# Patient Record
Sex: MALE | Race: WHITE | NOT HISPANIC OR LATINO | Employment: OTHER | URBAN - METROPOLITAN AREA
[De-identification: names, ages, dates, MRNs, and addresses within clinical notes are randomized per-mention and may not be internally consistent; named-entity substitution may affect disease eponyms.]

---

## 2022-05-19 DIAGNOSIS — M25.511 RIGHT SHOULDER PAIN, UNSPECIFIED CHRONICITY: Primary | ICD-10-CM

## 2024-12-10 ENCOUNTER — HOSPITAL ENCOUNTER (INPATIENT)
Facility: HOSPITAL | Age: 89
LOS: 2 days | Discharge: HOSPICE | DRG: 057 | End: 2024-12-12
Attending: STUDENT IN AN ORGANIZED HEALTH CARE EDUCATION/TRAINING PROGRAM | Admitting: INTERNAL MEDICINE
Payer: MEDICARE

## 2024-12-10 DIAGNOSIS — G20.A1 PARKINSON'S DISEASE, UNSPECIFIED WHETHER DYSKINESIA PRESENT, UNSPECIFIED WHETHER MANIFESTATIONS FLUCTUATE: ICD-10-CM

## 2024-12-10 DIAGNOSIS — R06.00 DYSPNEA: ICD-10-CM

## 2024-12-10 DIAGNOSIS — R44.1 HALLUCINATION, VISUAL: Primary | ICD-10-CM

## 2024-12-10 PROBLEM — R44.3 HALLUCINATIONS: Status: ACTIVE | Noted: 2024-12-10

## 2024-12-10 LAB
ALBUMIN SERPL-MCNC: 3.5 G/DL (ref 3.4–4.8)
ALBUMIN/GLOB SERPL: 1.1 RATIO (ref 1.1–2)
ALP SERPL-CCNC: 87 UNIT/L (ref 40–150)
ALT SERPL-CCNC: 8 UNIT/L (ref 0–55)
ANION GAP SERPL CALC-SCNC: 3 MEQ/L
AST SERPL-CCNC: 30 UNIT/L (ref 5–34)
BACTERIA #/AREA URNS AUTO: ABNORMAL /HPF
BASOPHILS # BLD AUTO: 0.04 X10(3)/MCL
BASOPHILS NFR BLD AUTO: 0.9 %
BILIRUB SERPL-MCNC: 1.4 MG/DL
BILIRUB UR QL STRIP.AUTO: NEGATIVE
BUN SERPL-MCNC: 27.7 MG/DL (ref 8.4–25.7)
CALCIUM SERPL-MCNC: 9.2 MG/DL (ref 8.8–10)
CHLORIDE SERPL-SCNC: 105 MMOL/L (ref 98–107)
CLARITY UR: ABNORMAL
CO2 SERPL-SCNC: 34 MMOL/L (ref 23–31)
COLOR UR AUTO: YELLOW
CREAT SERPL-MCNC: 1 MG/DL (ref 0.72–1.25)
CREAT/UREA NIT SERPL: 28
EOSINOPHIL # BLD AUTO: 0.05 X10(3)/MCL (ref 0–0.9)
EOSINOPHIL NFR BLD AUTO: 1.1 %
ERYTHROCYTE [DISTWIDTH] IN BLOOD BY AUTOMATED COUNT: 20.9 % (ref 11.5–17)
FLUAV AG UPPER RESP QL IA.RAPID: NOT DETECTED
FLUBV AG UPPER RESP QL IA.RAPID: NOT DETECTED
GFR SERPLBLD CREATININE-BSD FMLA CKD-EPI: >60 ML/MIN/1.73/M2
GLOBULIN SER-MCNC: 3.2 GM/DL (ref 2.4–3.5)
GLUCOSE SERPL-MCNC: 97 MG/DL (ref 82–115)
GLUCOSE UR QL STRIP: NORMAL
HCT VFR BLD AUTO: 29.7 % (ref 42–52)
HGB BLD-MCNC: 8.7 G/DL (ref 14–18)
HGB UR QL STRIP: ABNORMAL
IMM GRANULOCYTES # BLD AUTO: 0.02 X10(3)/MCL (ref 0–0.04)
IMM GRANULOCYTES NFR BLD AUTO: 0.4 %
KETONES UR QL STRIP: NEGATIVE
LACTATE SERPL-SCNC: 1.1 MMOL/L (ref 0.5–2.2)
LEUKOCYTE ESTERASE UR QL STRIP: 25
LYMPHOCYTES # BLD AUTO: 0.97 X10(3)/MCL (ref 0.6–4.6)
LYMPHOCYTES NFR BLD AUTO: 21.2 %
MCH RBC QN AUTO: 24.6 PG (ref 27–31)
MCHC RBC AUTO-ENTMCNC: 29.3 G/DL (ref 33–36)
MCV RBC AUTO: 84.1 FL (ref 80–94)
MONOCYTES # BLD AUTO: 0.56 X10(3)/MCL (ref 0.1–1.3)
MONOCYTES NFR BLD AUTO: 12.3 %
MUCOUS THREADS URNS QL MICRO: ABNORMAL /LPF
NEUTROPHILS # BLD AUTO: 2.93 X10(3)/MCL (ref 2.1–9.2)
NEUTROPHILS NFR BLD AUTO: 64.1 %
NITRITE UR QL STRIP: NEGATIVE
NRBC BLD AUTO-RTO: 0 %
PH UR STRIP: 6.5 [PH]
PLATELET # BLD AUTO: 128 X10(3)/MCL (ref 130–400)
PLATELETS.RETICULATED NFR BLD AUTO: 13.9 % (ref 0.9–11.2)
PMV BLD AUTO: ABNORMAL FL
POTASSIUM SERPL-SCNC: 4.4 MMOL/L (ref 3.5–5.1)
PROT SERPL-MCNC: 6.7 GM/DL (ref 5.8–7.6)
PROT UR QL STRIP: ABNORMAL
RBC # BLD AUTO: 3.53 X10(6)/MCL (ref 4.7–6.1)
RBC #/AREA URNS AUTO: >100 /HPF
RSV A 5' UTR RNA NPH QL NAA+PROBE: NOT DETECTED
SARS-COV-2 RNA RESP QL NAA+PROBE: NOT DETECTED
SODIUM SERPL-SCNC: 142 MMOL/L (ref 136–145)
SP GR UR STRIP.AUTO: 1.02 (ref 1–1.03)
SQUAMOUS #/AREA URNS LPF: ABNORMAL /HPF
TSH SERPL-ACNC: 14.49 UIU/ML (ref 0.35–4.94)
UROBILINOGEN UR STRIP-ACNC: NORMAL
VIT B12 SERPL-MCNC: >2000 PG/ML (ref 213–816)
WBC # BLD AUTO: 4.57 X10(3)/MCL (ref 4.5–11.5)
WBC #/AREA URNS AUTO: ABNORMAL /HPF

## 2024-12-10 PROCEDURE — 83605 ASSAY OF LACTIC ACID: CPT | Performed by: PHYSICIAN ASSISTANT

## 2024-12-10 PROCEDURE — 84443 ASSAY THYROID STIM HORMONE: CPT | Performed by: INTERNAL MEDICINE

## 2024-12-10 PROCEDURE — 80053 COMPREHEN METABOLIC PANEL: CPT | Performed by: PHYSICIAN ASSISTANT

## 2024-12-10 PROCEDURE — 81001 URINALYSIS AUTO W/SCOPE: CPT | Performed by: PHYSICIAN ASSISTANT

## 2024-12-10 PROCEDURE — 82607 VITAMIN B-12: CPT | Performed by: INTERNAL MEDICINE

## 2024-12-10 PROCEDURE — 25000003 PHARM REV CODE 250

## 2024-12-10 PROCEDURE — 85025 COMPLETE CBC W/AUTO DIFF WBC: CPT | Performed by: PHYSICIAN ASSISTANT

## 2024-12-10 PROCEDURE — 25000003 PHARM REV CODE 250: Performed by: INTERNAL MEDICINE

## 2024-12-10 PROCEDURE — 99222 1ST HOSP IP/OBS MODERATE 55: CPT | Mod: ,,, | Performed by: SPECIALIST

## 2024-12-10 PROCEDURE — 0241U COVID/RSV/FLU A&B PCR: CPT | Performed by: STUDENT IN AN ORGANIZED HEALTH CARE EDUCATION/TRAINING PROGRAM

## 2024-12-10 PROCEDURE — 99285 EMERGENCY DEPT VISIT HI MDM: CPT | Mod: 25

## 2024-12-10 PROCEDURE — 87040 BLOOD CULTURE FOR BACTERIA: CPT | Performed by: PHYSICIAN ASSISTANT

## 2024-12-10 PROCEDURE — 11000001 HC ACUTE MED/SURG PRIVATE ROOM

## 2024-12-10 RX ORDER — QUETIAPINE FUMARATE 25 MG/1
25 TABLET, FILM COATED ORAL NIGHTLY
Status: DISCONTINUED | OUTPATIENT
Start: 2024-12-10 | End: 2024-12-11

## 2024-12-10 RX ORDER — FINASTERIDE 5 MG/1
5 TABLET, FILM COATED ORAL DAILY
Status: ON HOLD | COMMUNITY
End: 2024-12-12

## 2024-12-10 RX ORDER — ONDANSETRON HYDROCHLORIDE 2 MG/ML
4 INJECTION, SOLUTION INTRAVENOUS EVERY 8 HOURS PRN
Status: DISCONTINUED | OUTPATIENT
Start: 2024-12-10 | End: 2024-12-12 | Stop reason: HOSPADM

## 2024-12-10 RX ORDER — ACETAMINOPHEN 325 MG/1
650 TABLET ORAL EVERY 8 HOURS PRN
Status: DISCONTINUED | OUTPATIENT
Start: 2024-12-10 | End: 2024-12-12 | Stop reason: HOSPADM

## 2024-12-10 RX ORDER — HYDRALAZINE HYDROCHLORIDE 20 MG/ML
10 INJECTION INTRAMUSCULAR; INTRAVENOUS EVERY 4 HOURS PRN
Status: DISCONTINUED | OUTPATIENT
Start: 2024-12-10 | End: 2024-12-12 | Stop reason: HOSPADM

## 2024-12-10 RX ORDER — DOXAZOSIN 8 MG/1
TABLET ORAL DAILY
Status: ON HOLD | COMMUNITY
End: 2024-12-12 | Stop reason: HOSPADM

## 2024-12-10 RX ORDER — IRON,CARBONYL/ASCORBIC ACID 100-250 MG
1 TABLET ORAL
COMMUNITY

## 2024-12-10 RX ORDER — DOXAZOSIN 4 MG/1
8 TABLET ORAL DAILY
Status: DISCONTINUED | OUTPATIENT
Start: 2024-12-11 | End: 2024-12-12 | Stop reason: HOSPADM

## 2024-12-10 RX ORDER — LANOLIN ALCOHOL/MO/W.PET/CERES
400 CREAM (GRAM) TOPICAL DAILY
Status: ON HOLD | COMMUNITY
End: 2024-12-12 | Stop reason: HOSPADM

## 2024-12-10 RX ORDER — LABETALOL HYDROCHLORIDE 5 MG/ML
20 INJECTION, SOLUTION INTRAVENOUS EVERY 4 HOURS PRN
Status: DISCONTINUED | OUTPATIENT
Start: 2024-12-10 | End: 2024-12-12 | Stop reason: HOSPADM

## 2024-12-10 RX ORDER — CARBIDOPA AND LEVODOPA 25; 100 MG/1; MG/1
1 TABLET, EXTENDED RELEASE ORAL
Status: ON HOLD | COMMUNITY
End: 2024-12-12

## 2024-12-10 RX ORDER — FINASTERIDE 5 MG/1
5 TABLET, FILM COATED ORAL DAILY
Status: DISCONTINUED | OUTPATIENT
Start: 2024-12-11 | End: 2024-12-12 | Stop reason: HOSPADM

## 2024-12-10 RX ORDER — ACETAMINOPHEN 325 MG/1
650 TABLET ORAL EVERY 4 HOURS PRN
Status: DISCONTINUED | OUTPATIENT
Start: 2024-12-10 | End: 2024-12-12 | Stop reason: HOSPADM

## 2024-12-10 RX ORDER — HYDRALAZINE HYDROCHLORIDE 20 MG/ML
10 INJECTION INTRAMUSCULAR; INTRAVENOUS
Status: DISCONTINUED | OUTPATIENT
Start: 2024-12-10 | End: 2024-12-10

## 2024-12-10 RX ORDER — METOPROLOL SUCCINATE 50 MG/1
75 TABLET, EXTENDED RELEASE ORAL DAILY
Status: ON HOLD | COMMUNITY
End: 2024-12-12 | Stop reason: HOSPADM

## 2024-12-10 RX ADMIN — QUETIAPINE FUMARATE 25 MG: 25 TABLET ORAL at 08:12

## 2024-12-10 RX ADMIN — APIXABAN 5 MG: 5 TABLET, FILM COATED ORAL at 11:12

## 2024-12-10 RX ADMIN — CARBIDOPA AND LEVODOPA 1 SPLIT TABLET: 25; 100 TABLET ORAL at 08:12

## 2024-12-10 RX ADMIN — CARBIDOPA AND LEVODOPA 1 SPLIT TABLET: 25; 100 TABLET ORAL at 03:12

## 2024-12-10 NOTE — CONSULTS
[facility]  Emergency Dept  Neurology  Consult Note    Patient Name: Liu Gaitan  MRN: [id number]  Admission Date: 12/10/2024  Hospital Length of Stay: 0 days  Code Status: No Order   Attending Provider: Gerson Cosme MD   Consulting Provider: DANIELA Villarreal  Primary Care Physician: No primary care provider on file.  Principal Problem:<principal problem not specified>    Inpatient consult to neurology  Consult performed by: Xenia Anthony AGACNP-BC  Consult ordered by: Gerson Cosme MD         Subjective:     Chief Complaint:       HPI:   89-year-old male with PMH BPH, HTN, AFib, and Parkinson's disease who presented to ED on 12/10 with complaints of auditory and visual hallucinations x2 days.     Patient's outpatient PD medication Sinemet 25/100 mg x1 tab 5 times daily.    CT head without negative for acute intracranial abnormalities.       No past medical history on file.    No past surgical history on file.    Review of patient's allergies indicates:  No Known Allergies    Current Neurological Medications:     No current facility-administered medications on file prior to encounter.     Current Outpatient Medications on File Prior to Encounter   Medication Sig    carbidopa-levodopa  mg (SINEMET CR)  mg TbSR Take 1 tablet by mouth 5 (five) times daily. Take with food    doxazosin (CARDURA) 8 MG Tab Take by mouth once daily.    edoxaban (SAVAYSA) 30 mg tablet Take 30 mg by mouth once daily.    finasteride (PROSCAR) 5 mg tablet Take 5 mg by mouth once daily.    iron-vitamin C 100-250 mg, ICAR-C, (ICAR-C) 100-250 mg Tab Take 1 tablet by mouth twice a week. Take 1 tablet every Monday and Thursday    magnesium oxide (MAG-OX) 400 mg (241.3 mg magnesium) tablet Take 400 mg by mouth once daily.    metoprolol succinate (TOPROL-XL) 50 MG 24 hr tablet Take 75 mg by mouth once daily.     Family History    None       Tobacco Use    Smoking status: Not on file    Smokeless tobacco:  Not on file   Substance and Sexual Activity    Alcohol use: Not on file    Drug use: Not on file    Sexual activity: Not on file     Review of Systems  A 14pt ros was reviewed & is negative unless o/w documented in the hpi    Objective:     Vital Signs (Most Recent):  Temp: 98.6 °F (37 °C) (12/10/24 1052)  Pulse: 63 (12/10/24 1052)  Resp: 18 (12/10/24 1052)  BP: (!) 190/88 (12/10/24 1052)  SpO2: 97 % (12/10/24 1052) Vital Signs (24h Range):  Temp:  [98.6 °F (37 °C)] 98.6 °F (37 °C)  Pulse:  [63] 63  Resp:  [18] 18  SpO2:  [97 %] 97 %  BP: (190)/(88) 190/88     Weight: 68 kg (150 lb)  Body mass index is 22.15 kg/m².     Physical Exam  Vitals reviewed.   Constitutional:       General: He is awake.   HENT:      Head: Normocephalic and atraumatic.      Nose: Nose normal.      Mouth/Throat:      Mouth: Mucous membranes are dry.   Eyes:      Extraocular Movements: Extraocular movements intact.      Pupils: Pupils are equal, round, and reactive to light.   Pulmonary:      Effort: Pulmonary effort is normal.   Psychiatric:         Behavior: Behavior is cooperative.          NEUROLOGICAL EXAMINATION:     MENTAL STATUS   Oriented to person.   Level of consciousness: alert    CRANIAL NERVES     CN II   Visual fields full to confrontation.     CN III, IV, VI   Pupils are equal, round, and reactive to light.  Conjugate gaze: present    MOTOR EXAM        Moves all extremities, no focal weakness noted  Contracted BUE and arthritic hands bilaterally     SENSORY EXAM   Light touch normal.     GAIT AND COORDINATION     Tremor   Resting tremor: absent      Significant Labs:   Recent Lab Results         12/10/24  1127   12/10/24  1126   12/10/24  1124        Immature Platelet Fraction 13.9           Albumin/Globulin Ratio 1.1           Albumin 3.5           ALP 87           ALT 8           Anion Gap 3.0           Appearance, UA     Turbid       AST 30           Bacteria, UA     None Seen       Baso # 0.04           Basophil % 0.9            Bilirubin (UA)     Negative       BILIRUBIN TOTAL 1.4           BUN 27.7           BUN/CREAT RATIO 28           Calcium 9.2           Chloride 105           CO2 34           Color, UA     Yellow       Creatinine 1.00           eGFR >60           Eos # 0.05           Eos % 1.1           Globulin, Total 3.2           Glucose 97           Glucose, UA     Normal       Hematocrit 29.7           Hemoglobin 8.7           Immature Grans (Abs) 0.02           Immature Granulocytes 0.4           Ketones, UA     Negative       Lactic Acid Level   1.1         Leukocyte Esterase, UA     25       Lymph # 0.97           LYMPH % 21.2           MCH 24.6           MCHC 29.3           MCV 84.1           Mono # 0.56           Mono % 12.3           MPV   Comment: N/a           Mucous, UA     Trace       Neut # 2.93           Neut % 64.1           NITRITE UA     Negative       nRBC 0.0           Blood, UA     3+       pH, UA     6.5       Platelet Count 128           Potassium 4.4           PROTEIN TOTAL 6.7           Protein, UA     1+       RBC 3.53           RBC, UA     >100       RDW 20.9           Sodium 142           Specific Gravity,UA     1.018       Squamous Epithelial Cells, UA     None Seen       Urobilinogen, UA     Normal       WBC, UA     0-5       WBC 4.57                   Significant Imaging:  CT head w/o:  FINDINGS:  There is no acute intracranial hemorrhage or edema. The gray-white matter differentiation is preserved.     There is no mass effect or midline shift.  There is diffuse parenchymal volume loss.  The basal cisterns are patent. There is no abnormal extra-axial fluid collection.  Carotid artery calcifications are noted.     The calvarium and skull base are intact.  There is trace scattered paranasal sinus mucosal thickening.  Small right mastoid effusion is noted.     Impression:     No acute intracranial abnormality.     I have reviewed all pertinent imaging results/findings within the past 24  hours.  Assessment and Plan:     Hallucinations  Decreased sinemet dosing to 1/2 tab TID  Encouraged family to have a family member at bedside throughout admission   Safety/fall precautions        VTE Risk Mitigation (From admission, onward)      None            Thank you for your consult.  Further recommendations to follow per MD Xenia Anthony, [facility]-BC  Neurology  [facility] - Emergency Dept

## 2024-12-10 NOTE — SUBJECTIVE & OBJECTIVE
No past medical history on file.    No past surgical history on file.    Review of patient's allergies indicates:  No Known Allergies    Current Neurological Medications:     No current facility-administered medications on file prior to encounter.     Current Outpatient Medications on File Prior to Encounter   Medication Sig    carbidopa-levodopa  mg (SINEMET CR)  mg TbSR Take 1 tablet by mouth 5 (five) times daily. Take with food    doxazosin (CARDURA) 8 MG Tab Take by mouth once daily.    edoxaban (SAVAYSA) 30 mg tablet Take 30 mg by mouth once daily.    finasteride (PROSCAR) 5 mg tablet Take 5 mg by mouth once daily.    iron-vitamin C 100-250 mg, ICAR-C, (ICAR-C) 100-250 mg Tab Take 1 tablet by mouth twice a week. Take 1 tablet every Monday and Thursday    magnesium oxide (MAG-OX) 400 mg (241.3 mg magnesium) tablet Take 400 mg by mouth once daily.    metoprolol succinate (TOPROL-XL) 50 MG 24 hr tablet Take 75 mg by mouth once daily.     Family History    None       Tobacco Use    Smoking status: Not on file    Smokeless tobacco: Not on file   Substance and Sexual Activity    Alcohol use: Not on file    Drug use: Not on file    Sexual activity: Not on file     Review of Systems  A 14pt ros was reviewed & is negative unless o/w documented in the hpi    Objective:     Vital Signs (Most Recent):  Temp: 98.6 °F (37 °C) (12/10/24 1052)  Pulse: 63 (12/10/24 1052)  Resp: 18 (12/10/24 1052)  BP: (!) 190/88 (12/10/24 1052)  SpO2: 97 % (12/10/24 1052) Vital Signs (24h Range):  Temp:  [98.6 °F (37 °C)] 98.6 °F (37 °C)  Pulse:  [63] 63  Resp:  [18] 18  SpO2:  [97 %] 97 %  BP: (190)/(88) 190/88     Weight: 68 kg (150 lb)  Body mass index is 22.15 kg/m².     Physical Exam  Vitals reviewed.   Constitutional:       General: He is awake.   HENT:      Head: Normocephalic and atraumatic.      Nose: Nose normal.      Mouth/Throat:      Mouth: Mucous membranes are dry.   Eyes:      Extraocular Movements: Extraocular  movements intact.      Pupils: Pupils are equal, round, and reactive to light.   Pulmonary:      Effort: Pulmonary effort is normal.   Psychiatric:         Behavior: Behavior is cooperative.          NEUROLOGICAL EXAMINATION:     MENTAL STATUS   Oriented to person.   Level of consciousness: alert    CRANIAL NERVES     CN II   Visual fields full to confrontation.     CN III, IV, VI   Pupils are equal, round, and reactive to light.  Conjugate gaze: present    MOTOR EXAM        Moves all extremities, no focal weakness noted  Contracted BUE and arthritic hands bilaterally     SENSORY EXAM   Light touch normal.     GAIT AND COORDINATION     Tremor   Resting tremor: absent      Significant Labs:   Recent Lab Results         12/10/24  1127   12/10/24  1126   12/10/24  1124        Immature Platelet Fraction 13.9           Albumin/Globulin Ratio 1.1           Albumin 3.5           ALP 87           ALT 8           Anion Gap 3.0           Appearance, UA     Turbid       AST 30           Bacteria, UA     None Seen       Baso # 0.04           Basophil % 0.9           Bilirubin (UA)     Negative       BILIRUBIN TOTAL 1.4           BUN 27.7           BUN/CREAT RATIO 28           Calcium 9.2           Chloride 105           CO2 34           Color, UA     Yellow       Creatinine 1.00           eGFR >60           Eos # 0.05           Eos % 1.1           Globulin, Total 3.2           Glucose 97           Glucose, UA     Normal       Hematocrit 29.7           Hemoglobin 8.7           Immature Grans (Abs) 0.02           Immature Granulocytes 0.4           Ketones, UA     Negative       Lactic Acid Level   1.1         Leukocyte Esterase, UA     25       Lymph # 0.97           LYMPH % 21.2           MCH 24.6           MCHC 29.3           MCV 84.1           Mono # 0.56           Mono % 12.3           MPV   Comment: N/a           Mucous, UA     Trace       Neut # 2.93           Neut % 64.1           NITRITE UA     Negative       nRBC 0.0            Blood, UA     3+       pH, UA     6.5       Platelet Count 128           Potassium 4.4           PROTEIN TOTAL 6.7           Protein, UA     1+       RBC 3.53           RBC, UA     >100       RDW 20.9           Sodium 142           Specific Gravity,UA     1.018       Squamous Epithelial Cells, UA     None Seen       Urobilinogen, UA     Normal       WBC, UA     0-5       WBC 4.57                   Significant Imaging:  CT head w/o:  FINDINGS:  There is no acute intracranial hemorrhage or edema. The gray-white matter differentiation is preserved.     There is no mass effect or midline shift.  There is diffuse parenchymal volume loss.  The basal cisterns are patent. There is no abnormal extra-axial fluid collection.  Carotid artery calcifications are noted.     The calvarium and skull base are intact.  There is trace scattered paranasal sinus mucosal thickening.  Small right mastoid effusion is noted.     Impression:     No acute intracranial abnormality.     I have reviewed all pertinent imaging results/findings within the past 24 hours.

## 2024-12-10 NOTE — PROGRESS NOTES
Patients daughter, Maddie Carreno [phone], stopped me in the hallway stating that the patient had [facility] but they are not associated with a hospice and needed a list of hospices. She said that Dr. Payne spoke with them and the plan is for pt to admit and then they would like for pt to dc home with hospice in the home. Provided list per request. Wife Shahana [phone], signed FOC for [facility]. Informed daughter that order for hospice is not currently in the chart and would need to be entered to coordinate referral. She voiced understanding and said that Dr. Payne would be entering it but she wanted the referral to be sent now so that she could speak with a rep about services. Therefore, referral sent per request for information.

## 2024-12-10 NOTE — ED PROVIDER NOTES
Encounter Date: 12/10/2024    SCRIBE #1 NOTE: I, Alon Jeter, am scribing for, and in the presence of,  Gerson Cosme MD. I have scribed the following portions of the note - Other sections scribed: HPI, ROS, PE.       History     Chief Complaint   Patient presents with    Hallucinations    Urinary Tract Infection     Pt arrived [facility], d/t family members stated that pt is auditory/visual hallucination x2 days. Pt recently dx with UTI, EMS states pt has not taken all antibiotics. 4mg Zofran given in route. Pt oriented to self.      Patient is an 89 y.o. male with a PMHx of BPH, HTN, a-fib, and Parkinson's presenting to the ED with complaints of hallucinations that onset 2 days ago. Per EMS, the patient went to [facility] on 12/6 and was diagnosed with a UTI and discharged with 750 mg levofloxacin. He spoke with his PCP, Dr. Kimmie Amador yesterday who stated his urine did not actually grow a culture, so she advised the patient to stop taking the levofloxacin. The patient's family members called EMS because the patient was having auditory and visual hallucinations and they wanted him to be evaluated.     The patient denies being in any current pain and further denies any fever, chills, or chest pain. Patient states he is seeing cartoons that are not on the TV. He also endorses mild SOB over the last couple weeks.     Patient's spouse reports that the patient had hematuria on Friday, so she took him to [facility]. They did a CT scan and blood work that revealed a UTI and they sent him home with levaquin. She says he was taking the levaquin, but his stomach began hurting yesterday so she called his PCP, Dr. Kimmie Amador and she informed her that he needs to stop taking the levaquin because his urine did not grow a culture. She adds that Dr. Amador suggest a single rocephin injection instead to clear it up. His spouse reports that the hallucinations have been happening prior to taking the levaquin and he is seeing people in  their house. He was referred to a neurologist and had an appointment last month, but refused to go because he did not feel like doing all the tests.     The history is provided by the patient and the EMS personnel. No  was used.     Review of patient's allergies indicates:  No Known Allergies  No past medical history on file.  No past surgical history on file.  No family history on file.     Review of Systems   Constitutional:  Negative for chills and fever.   Respiratory:  Positive for shortness of breath.    Cardiovascular:  Negative for chest pain.   Gastrointestinal:  Negative for abdominal pain.   Psychiatric/Behavioral:  Positive for hallucinations.        Physical Exam     Initial Vitals [12/10/24 1052]   BP Pulse Resp Temp SpO2   (!) 190/88 63 18 98.6 °F (37 °C) 97 %      MAP       --         Physical Exam    Constitutional: He is not diaphoretic. No distress.   HENT:   Head: Normocephalic and atraumatic.   Right Ear: External ear normal.   Left Ear: External ear normal.   Nose: Nose normal.   Eyes: EOM are normal. Pupils are equal, round, and reactive to light. Right eye exhibits no discharge. Left eye exhibits no discharge.   Cardiovascular:  Normal rate, regular rhythm and normal heart sounds.     Exam reveals no gallop and no friction rub.       No murmur heard.  Pulmonary/Chest: Effort normal and breath sounds normal. No respiratory distress. He has no wheezes. He has no rhonchi. He has no rales. He exhibits no tenderness.   Abdominal: Abdomen is soft. Bowel sounds are normal. He exhibits no distension and no mass. There is no abdominal tenderness. There is no rebound and no guarding.   Musculoskeletal:         General: No edema. Normal range of motion.     Neurological: He is alert and oriented to person, place, and time. No cranial nerve deficit or sensory deficit.   No focal or neuro deficits   Skin: Skin is warm and dry. Capillary refill takes less than 2 seconds.         ED  Course   Procedures  Labs Reviewed   COMPREHENSIVE METABOLIC PANEL - Abnormal       Result Value    Sodium 142      Potassium 4.4      Chloride 105      CO2 34 (*)     Glucose 97      Blood Urea Nitrogen 27.7 (*)     Creatinine 1.00      Calcium 9.2      Protein Total 6.7      Albumin 3.5      Globulin 3.2      Albumin/Globulin Ratio 1.1      Bilirubin Total 1.4      ALP 87      ALT 8      AST 30      eGFR >60      Anion Gap 3.0      BUN/Creatinine Ratio 28     URINALYSIS, REFLEX TO URINE CULTURE - Abnormal    Color, UA Yellow      Appearance, UA Turbid (*)     Specific Gravity, UA 1.018      pH, UA 6.5      Protein, UA 1+ (*)     Glucose, UA Normal      Ketones, UA Negative      Blood, UA 3+ (*)     Bilirubin, UA Negative      Urobilinogen, UA Normal      Nitrites, UA Negative      Leukocyte Esterase, UA 25 (*)     RBC, UA >100 (*)     WBC, UA 0-5      Bacteria, UA None Seen      Squamous Epithelial Cells, UA None Seen      Mucous, UA Trace (*)    CBC WITH DIFFERENTIAL - Abnormal    WBC 4.57      RBC 3.53 (*)     Hgb 8.7 (*)     Hct 29.7 (*)     MCV 84.1      MCH 24.6 (*)     MCHC 29.3 (*)     RDW 20.9 (*)     Platelet 128 (*)     MPV        Neut % 64.1      Lymph % 21.2      Mono % 12.3      Eos % 1.1      Basophil % 0.9      Lymph # 0.97      Neut # 2.93      Mono # 0.56      Eos # 0.05      Baso # 0.04      IG# 0.02      IG% 0.4      NRBC% 0.0      IPF 13.9 (*)    LACTIC ACID, PLASMA - Normal    Lactic Acid Level 1.1     COVID/RSV/FLU A&B PCR - Normal    Influenza A PCR Not Detected      Influenza B PCR Not Detected      Respiratory Syncytial Virus PCR Not Detected      SARS-CoV-2 PCR Not Detected      Narrative:     The Xpert Xpress SARS-CoV-2/FLU/RSV plus is a rapid, multiplexed real-time PCR test intended for the simultaneous qualitative detection and differentiation of SARS-CoV-2, Influenza A, Influenza B, and respiratory syncytial virus (RSV) viral RNA in either nasopharyngeal swab or nasal swab  specimens.         BLOOD CULTURE [facility]   BLOOD CULTURE [facility]   CBC W/ AUTO DIFFERENTIAL    Narrative:     The following orders were created for panel order CBC auto differential.  Procedure                               Abnormality         Status                     ---------                               -----------         ------                     CBC with Differential[[id number]]       Abnormal            Final result                 Please view results for these tests on the individual orders.          Imaging Results              X-Ray Chest AP Portable (Final result)  Result time 12/10/24 14:00:03      Final result by Mony Beltran MD (12/10/24 14:00:03)                   Impression:      1. Right-sided hazy airspace opacities with small effusion.  2. Suspected large hiatal hernia projecting over the mediastinum.      Electronically signed by: Mony Beltran  Date:    12/10/2024  Time:    14:00               Narrative:    EXAMINATION:  XR CHEST AP PORTABLE    CLINICAL HISTORY:  Dyspnea, unspecified    COMPARISON:  There are no available comparisons at the time of dictation.    FINDINGS:  The heart is prominent in size.  There are hazy opacities over the right lung with likely small pleural effusion.  There is a suspected large hiatal hernia projecting over the mediastinum.  There is no visible pneumothorax.                                       CT Head Without Contrast (Final result)  Result time 12/10/24 11:43:36      Final result by Mony Beltran MD (12/10/24 11:43:36)                   Impression:      No acute intracranial abnormality.      Electronically signed by: Mony Beltran  Date:    12/10/2024  Time:    11:43               Narrative:    EXAMINATION:  CT HEAD WITHOUT CONTRAST    CLINICAL HISTORY:  Mental status change, unknown cause;    TECHNIQUE:  Axial scans were obtained from skull base to the vertex.    Coronal and sagittal reconstructions obtained from the axial  data.    Automatic exposure control was utilized to limit radiation dose.    Contrast: None    Radiation Dose:    Total DLP: 3148 mGy*cm    COMPARISON:  None    FINDINGS:  There is no acute intracranial hemorrhage or edema. The gray-white matter differentiation is preserved.    There is no mass effect or midline shift.  There is diffuse parenchymal volume loss.  The basal cisterns are patent. There is no abnormal extra-axial fluid collection.  Carotid artery calcifications are noted.    The calvarium and skull base are intact.  There is trace scattered paranasal sinus mucosal thickening.  Small right mastoid effusion is noted.                                       Medications   hydrALAZINE injection 10 mg (0 mg Intravenous Hold 12/10/24 1315)   carbidopa-levodopa 12.5-50 mg split tablet 1 split tablet (has no administration in time range)   ondansetron injection 4 mg (has no administration in time range)   acetaminophen tablet 650 mg (has no administration in time range)   acetaminophen tablet 650 mg (has no administration in time range)     Medical Decision Making  Differential diagnosis includes but is not limited to Medication side effect, worsening Parkinson's, worsening dementia, psychiatric disorder, infectious process, UTI.      Patient is awake alert well-appearing.  Reports that occasionally he sees cartoon characters.  Otherwise awake alert.  Denies any complaints at this time otherwise.  No dysuria.  No obvious urinary tract infection on today's labs.  Was recently on Levaquin which can cause some psychiatric disorders.  Also on carbidopa levodopa with a history of Parkinson's can also cause his symptoms.  We will admit for further evaluation and management.  We will not placed under pec due to patient's lucidity.  Admit to medicine for further evaluation management psychiatric, Neurology consult.  Did discuss patient with Dr. Kerry lambert here in the emergency department.  We will admit to hospitalist  discussed with Melva happy to admit.    Amount and/or Complexity of Data Reviewed  Independent Historian: spouse and EMS     Details: Per EMS, the patient went to [facility] on 12/6 and was diagnosed with a UTI and discharged with 750 mg levofloxacin. He spoke with his PCP, Dr. Kimmie Amador yesterday who stated his urine did not actually grow a culture, so she advised the patient to stop taking the levofloxacin. The patient's family members called EMS because the patient was having auditory and visual hallucinations and they wanted him to be evaluated.     Patient's spouse reports that the patient had hematuria on Friday, so she took him to [facility]. They did a CT scan and blood work that revealed a UTI and they sent him home with levaquin. She says he was taking the levaquin, but his stomach began hurting yesterday so she called his PCP, Dr. Kimmie Amador and she informed her that he needs to stop taking the levaquin because his urine did not grow a culture. She adds that Dr. Amador suggest a single rocephin injection instead to clear it up. His spouse reports that the hallucinations have been happening prior to taking the levaquin and he is seeing people in their house. He was referred to a neurologist and had an appointment last month, but refused to go because he did not feel like doing all the tests.     External Data Reviewed: notes.     Details: See ED course  Labs: ordered. Decision-making details documented in ED Course.  Radiology: ordered. Decision-making details documented in ED Course.    Risk  Prescription drug management.  Decision regarding hospitalization.            Scribe Attestation:   Scribe #1: I performed the above scribed service and the documentation accurately describes the services I performed. I attest to the accuracy of the note.    Attending Attestation:           Physician Attestation for Scribe:  Physician Attestation Statement for Scribe #1: I, Gerson Cosme MD, reviewed documentation, as  scribed by Alon Jeter in my presence, and it is both accurate and complete.             ED Course as of 12/10/24 1458   Tue Dec 10, 2024   3867 Chart review reveals that patient is seen at outside hospital 12/06/2024 at that time with a hematuria.  Evidently anticoagulated on AFib, history of Parkinson's as well.  Evidently has history of BPH, hematuria diagnosed with a UTI.  Given Rocephin in the emergency department.    Evidently his urine culture grew no bacteria so the discontinuing of the levofloxacin [MM]   1154 CBC auto differential(!)  Anemia unknown baseline.  No leukocytosis [MM]   1155 Urinalysis, Reflex to Urine Culture(!)  No bacteria in urine [MM]   1155 CT Head Without Contrast  Negative for acute [MM]   1223 Comprehensive metabolic panel(!)  No significant electrolyte abnormality or renal dysfunction. [MM]      ED Course User Index  [MM] Gerson Cosme MD                           Clinical Impression:  Final diagnoses:  [R06.00] Dyspnea  [R44.1] Hallucination, visual (Primary)  [G20.A1] Parkinson's disease, unspecified whether dyskinesia present, unspecified whether manifestations fluctuate          ED Disposition Condition    Admit Stable                Gerson Cosme MD  12/10/24 6654

## 2024-12-10 NOTE — Clinical Note
Diagnosis: Dyspnea [157791]   Future Attending Provider: MICHAEL RAMSAY [[id number]]   Admit to which facility:: [facility] [[id number]]   Reason for IP Medical Treatment  (Clinical interventions that can only be accomplished in the IP setting? ) :: Hallucinations, history of Parkinson's   Plans for Post-Acute care--if anticipated (pick the single best option):: A. No post acute care anticipated at this time

## 2024-12-10 NOTE — HPI
89-year-old male with PMH BPH, HTN, AFib, and Parkinson's disease who presented to ED on 12/10 with complaints of auditory and visual hallucinations x2 days.     Patient's outpatient PD medication Sinemet 25/100 mg x1 tab 5 times daily.    CT head without negative for acute intracranial abnormalities.

## 2024-12-10 NOTE — ASSESSMENT & PLAN NOTE
Decreased sinemet dosing to 1/2 tab TID  Encouraged family to have a family member at bedside throughout admission   Safety/fall precautions

## 2024-12-11 LAB
ALBUMIN SERPL-MCNC: 3.2 G/DL (ref 3.4–4.8)
ALBUMIN/GLOB SERPL: 1.1 RATIO (ref 1.1–2)
ALP SERPL-CCNC: 91 UNIT/L (ref 40–150)
ALT SERPL-CCNC: 7 UNIT/L (ref 0–55)
ANION GAP SERPL CALC-SCNC: 2 MEQ/L
AST SERPL-CCNC: 28 UNIT/L (ref 5–34)
BASOPHILS # BLD AUTO: 0.09 X10(3)/MCL
BASOPHILS NFR BLD AUTO: 1.6 %
BILIRUB SERPL-MCNC: 1.2 MG/DL
BUN SERPL-MCNC: 27.1 MG/DL (ref 8.4–25.7)
CALCIUM SERPL-MCNC: 8.6 MG/DL (ref 8.8–10)
CHLORIDE SERPL-SCNC: 105 MMOL/L (ref 98–107)
CO2 SERPL-SCNC: 34 MMOL/L (ref 23–31)
CREAT SERPL-MCNC: 1.04 MG/DL (ref 0.72–1.25)
CREAT/UREA NIT SERPL: 26
EOSINOPHIL # BLD AUTO: 0.09 X10(3)/MCL (ref 0–0.9)
EOSINOPHIL NFR BLD AUTO: 1.6 %
ERYTHROCYTE [DISTWIDTH] IN BLOOD BY AUTOMATED COUNT: 20.2 % (ref 11.5–17)
GFR SERPLBLD CREATININE-BSD FMLA CKD-EPI: >60 ML/MIN/1.73/M2
GLOBULIN SER-MCNC: 2.9 GM/DL (ref 2.4–3.5)
GLUCOSE SERPL-MCNC: 75 MG/DL (ref 82–115)
HCT VFR BLD AUTO: 31.3 % (ref 42–52)
HGB BLD-MCNC: 9.1 G/DL (ref 14–18)
IMM GRANULOCYTES # BLD AUTO: 0.19 X10(3)/MCL (ref 0–0.04)
IMM GRANULOCYTES NFR BLD AUTO: 3.4 %
LYMPHOCYTES # BLD AUTO: 1.7 X10(3)/MCL (ref 0.6–4.6)
LYMPHOCYTES NFR BLD AUTO: 30.6 %
MCH RBC QN AUTO: 24.7 PG (ref 27–31)
MCHC RBC AUTO-ENTMCNC: 29.1 G/DL (ref 33–36)
MCV RBC AUTO: 84.8 FL (ref 80–94)
MONOCYTES # BLD AUTO: 0.85 X10(3)/MCL (ref 0.1–1.3)
MONOCYTES NFR BLD AUTO: 15.3 %
NEUTROPHILS # BLD AUTO: 2.63 X10(3)/MCL (ref 2.1–9.2)
NEUTROPHILS NFR BLD AUTO: 47.5 %
NRBC BLD AUTO-RTO: 0 %
PLATELET # BLD AUTO: 122 X10(3)/MCL (ref 130–400)
PLATELETS.RETICULATED NFR BLD AUTO: 15.8 % (ref 0.9–11.2)
PMV BLD AUTO: ABNORMAL FL
POTASSIUM SERPL-SCNC: 4.7 MMOL/L (ref 3.5–5.1)
PROT SERPL-MCNC: 6.1 GM/DL (ref 5.8–7.6)
RBC # BLD AUTO: 3.69 X10(6)/MCL (ref 4.7–6.1)
SODIUM SERPL-SCNC: 141 MMOL/L (ref 136–145)
WBC # BLD AUTO: 5.55 X10(3)/MCL (ref 4.5–11.5)

## 2024-12-11 PROCEDURE — 25000003 PHARM REV CODE 250: Performed by: INTERNAL MEDICINE

## 2024-12-11 PROCEDURE — 86780 TREPONEMA PALLIDUM: CPT | Performed by: INTERNAL MEDICINE

## 2024-12-11 PROCEDURE — 85025 COMPLETE CBC W/AUTO DIFF WBC: CPT | Performed by: NURSE PRACTITIONER

## 2024-12-11 PROCEDURE — 36415 COLL VENOUS BLD VENIPUNCTURE: CPT | Performed by: NURSE PRACTITIONER

## 2024-12-11 PROCEDURE — 80053 COMPREHEN METABOLIC PANEL: CPT | Performed by: NURSE PRACTITIONER

## 2024-12-11 PROCEDURE — 11000001 HC ACUTE MED/SURG PRIVATE ROOM

## 2024-12-11 RX ORDER — QUETIAPINE FUMARATE 25 MG/1
50 TABLET, FILM COATED ORAL NIGHTLY
Status: DISCONTINUED | OUTPATIENT
Start: 2024-12-11 | End: 2024-12-12 | Stop reason: HOSPADM

## 2024-12-11 RX ORDER — FUROSEMIDE 20 MG/1
20 TABLET ORAL EVERY OTHER DAY
Status: DISCONTINUED | OUTPATIENT
Start: 2024-12-11 | End: 2024-12-12 | Stop reason: HOSPADM

## 2024-12-11 RX ADMIN — QUETIAPINE FUMARATE 50 MG: 25 TABLET ORAL at 08:12

## 2024-12-11 RX ADMIN — APIXABAN 5 MG: 5 TABLET, FILM COATED ORAL at 05:12

## 2024-12-11 RX ADMIN — FUROSEMIDE 20 MG: 20 TABLET ORAL at 05:12

## 2024-12-11 RX ADMIN — CARBIDOPA AND LEVODOPA 1 SPLIT TABLET: 25; 100 TABLET ORAL at 08:12

## 2024-12-11 RX ADMIN — DOXAZOSIN 8 MG: 4 TABLET ORAL at 09:12

## 2024-12-11 RX ADMIN — CARBIDOPA AND LEVODOPA 1 SPLIT TABLET: 25; 100 TABLET ORAL at 05:12

## 2024-12-11 RX ADMIN — FINASTERIDE 5 MG: 5 TABLET, FILM COATED ORAL at 09:12

## 2024-12-11 RX ADMIN — CARBIDOPA AND LEVODOPA 1 SPLIT TABLET: 25; 100 TABLET ORAL at 09:12

## 2024-12-11 RX ADMIN — METOPROLOL SUCCINATE 75 MG: 50 TABLET, EXTENDED RELEASE ORAL at 09:12

## 2024-12-11 NOTE — H&P
Hospital Medicine  History & Physical Examination       Patient: Liu Gaitan  MRN: [id number]  STATUS: IP- Inpatient   DOS: 12/10/2024   PCP: Tremayne, Provider      CC: AMS       HISTORY OF PRESENT ILLNESS   89 y.o. male with a history that includes Parkinson's and dementia, presented to ED with worsening AMS x 2 days, with auditory and visual hallucinations.  He was diagnosed a UTI on 12/06 at [facility] after presenting there with hematuria.  He was started on Levaquin and discharged home.  He was subsequently complaining of abdominal discomfort, and family contacted his PCP who noted urine culture was negative and therefore was told to stop  Levaquin.  Wife was able to relay at his hallucinations has been going on for quite some time, at least several months.  She does note that they are getting much worse over the last several days.  She notes that the main reason for presentation is that she is unable to care for him at home with his degree of altered mental status.      REVIEW OF SYSTEMS     Except as documented, all other systems reviewed and negative       PAST MEDICAL HISTORY     Parkinson's disease   BPH  Hypertension   Atrial fibrillation  Macular degeneration   Osteoarthritis  Iron-deficiency anemia       PAST SURGICAL HISTORY     Adult circumcision   Cataract surgery   Repair of incarcerated left inguinal hernia      FAMILY HISTORY     Reviewed, negative in relation to patient's current condition.      SOCIAL HISTORY     Denies any history of alcohol, tobacco or drug abuse  Screening for Social Drivers of health:  []Housing/Food Insecurity []Transportation Needs []Utility Difficulties []Interpersonal safety [x]Noncontributory      ALLERGIES     NKDA      HOME MEDICATIONS     carbidopa-levodopa  mg (SINEMET CR)  mg TbSR 1 tablet, 5 times daily   doxazosin (CARDURA) 8 MG Tab Daily   edoxaban (SAVAYSA) 30 mg, Daily   finasteride (PROSCAR) 5 mg, Daily   iron-vitamin C 100-250 mg, ICAR-C, (ICAR-C)  100-250 mg Tab 1 tablet, Twice weekly   magnesium oxide (MAG-OX) 400 mg, Daily   metoprolol succinate (TOPROL-XL) 75 mg, Daily       PHYSICAL EXAM   VITALS: T 98.6 °F (37 °C)   /65   P 72   RR 12   O2 95 %    GENERAL:  Somnolent, but appears in NAD  HEENT: NC/AT, EOMI, PERRL.  NECK: Supple,  No JVD  LUNGS: CTA anteriorly  CVS: RRR, S1S2 positive  GI/: Soft, NT/ND, bowel sounds positive.  EXTREMITIES: Peripheral pulses 2+, no peripheral edema  DERM: Warm, dry.  No rashes or lesions noted.  NEURO:  Drowsy but awakens, partially oriented   PSYCH: Cooperative, appropriate mood and affect       DIAGNOSTICS     Recent Labs     12/10/24  1127   WBC 4.57   RBC 3.53*   HGB 8.7*   HCT 29.7*   MCV 84.1   MCH 24.6*   MCHC 29.3*   RDW 20.9*   *      Recent Labs     12/10/24  1127      K 4.4   CO2 34*   BUN 27.7*   CREATININE 1.00   GLUCOSE 97   CALCIUM 9.2   ALBUMIN 3.5   GLOBULIN 3.2   ALKPHOS 87   ALT 8   AST 30   BILITOT 1.4          X-Ray Chest AP Portable  Narrative:  The heart is prominent in size.  There are hazy opacities over the right lung with likely small pleural effusion.  There is a suspected large hiatal hernia projecting over the mediastinum.  There is no visible pneumothorax.  Impression:   1. Right-sided hazy airspace opacities with small effusion.  2. Suspected large hiatal hernia projecting over the mediastinum.  Electronically signed by: Mony Beltran  Date:    12/10/2024  Time:    14:00    CT Head Without Contrast  Narrative:   There is no acute intracranial hemorrhage or edema. The gray-white matter differentiation is preserved.  There is no mass effect or midline shift.  There is diffuse parenchymal volume loss.  The basal cisterns are patent. There is no abnormal extra-axial fluid collection.  Carotid artery calcifications are noted.  The calvarium and skull base are intact.  There is trace scattered paranasal sinus mucosal thickening.  Small right mastoid effusion is  noted.  Impression:   No acute intracranial abnormality.  Electronically signed by: Mony Beltran  Date:    12/10/2024  Time:    11:43        ASSESSMENT   A/V Hallucinations  Suspect underlying dementia  h/o Parkinson's disease  PAF on edoxaban    PLAN   Will monitor, can give a trial of Seroquel tonight  Meeting with hospice tomorrow  Could also require NH placement pending observation  Home medications reviewed and resumed as deemed appropriate          Prophylaxis: B/L SCDs  Code Status: DNR      Silvano Arteaga MD  Hospital Medicine            If the patient has been admitted under observation status, it is at my discretion and under our care with hospital medicine services. [TWA]

## 2024-12-11 NOTE — CONSULTS
"12/11/2024  Liu Gaitan   1935   [id number]       Initial Psychiatric Consult    Chief complaint: "I'm alright"     SUBJECTIVE:   HPI:   Liu Gaitan is a 89 y.o. male who is currently admitted with altered mental status. Psychiatry has been consulted to address acute psychosis. 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 He has a past surgical history of adult circumcision, cataract surgery, and repair incarcerated left inguinal hernia.  The patient was admitted to [facility] on December 6, 2024 presenting with hematuria and was diagnosed with an UTI and prescribed Levaquin. He was then discharged home. Upon discharge his wife began to note an increase in the frequency of hallucinations, more so at night time.  Neurology was consulted.  Sinemet was decreased.  Per nursing staff, there's discussion regarding nursing home placement or hospice.    At the time of the assessment, the patient's wife, daughter, and son is at the bedside.  Patient is lying in bed no acute distress noted.  Patient is alert and oriented to self.  The patient admits to some anxiety symptoms.  Symptoms include: worrying, and nervousness," according to his wife. According to the patient's wife the patient has longstanding history of undiagnosed anxiety disorder.  He denies any symptoms of depression including low mood, sadness, worthlessness, hopelessness, or passive thoughts of death.  He denies any symptoms of a panic attack.  He denies any " symptoms of hypomania or manic like symptoms.  He denies any symptoms of psychosis.  According to the wife he does experience hallucinations specifically in the evening and at bedtime. She states the new medication, Seroquel, has helped the patient to fall asleep and remain asleep. She states his behavior is better managed at this present time.  According to the wife the patients appetite is good.  He denies any suicidal thoughts, homicidal thoughts, passive thoughts of death, or self-injurious behaviors.      Collateral:   Medical records, wife, and children    Past Psychiatric History:   Previous Psychiatric Hospitalizations: Denies    Previous Medication Trials: Currently on Seroquel   Previous Suicide Attempts: Denies     History of Violence: Denies     Outpatient psychiatrist: Denies       Current Medications:   Home Meds:  Sinemet 25 mg, Cardura 8 mg, Proscar 5 mg, iron supplement, magnesium oxide 400 mg, metoprolol 50 mg    Past Medical/Surgical History:   Adult circumcision, cataract surgery, and repair incarcerated left inguinal hernia    Family Psychiatric History:   Denies        Scheduled Meds:    apixaban  5 mg Oral BID    carbidopa-levodopa 12.5-50 mg  1 split tablet Oral TID    doxazosin  8 mg Oral Daily    finasteride  5 mg Oral Daily    metoprolol succinate  75 mg Oral Daily    QUEtiapine  25 mg Oral QHS    zinc oxide-cod liver oil   Topical (Top) Q3 Days      PRN Meds:   Current Facility-Administered Medications:     acetaminophen, 650 mg, Oral, Q8H PRN    acetaminophen, 650 mg, Oral, Q4H PRN    hydrALAZINE, 10 mg, Intravenous, Q4H PRN    labetalol, 20 mg, Intravenous, Q4H PRN    ondansetron, 4 mg, Intravenous, Q8H PRN   Psychotherapeutics (From admission, onward)      Start     Stop Route Frequency Ordered    12/10/24 2100  QUEtiapine tablet 25 mg         -- Oral Nightly 12/10/24 8696            Allergies:   Review of patient's allergies indicates:  No Known Allergies       Substance Abuse  History:   Tobacco Use: Denies     Use of Alcohol: Denies      Recreational Drugs:Denies      Rehab History:Denies          Nerurological History:   Seizures: Denies      Head trauma: Denies        Social History:  Housing Status: Resides with wife   Relationship Status/Sexual Orientation: Heterosexual/    Children: 3   Education: High School   Employment Status/Info: Disabled     history: Denies     History of physical/sexual abuse: Denies      Access to gun: Denies          Legal History:   Past Charges/Incarcerations:Denies      Pending charges: Denies          OBJECTIVE:   Vitals   Vitals:    12/11/24 1115   BP: 102/65   Pulse: 71   Resp:    Temp: 97.9 °F (36.6 °C)        Labs/Imaging/Studies:   Recent Results (from the past 48 hours)   Urinalysis, Reflex to Urine Culture    Collection Time: 12/10/24 11:24 AM    Specimen: Urine   Result Value Ref Range    Color, UA Yellow Yellow, Light-Yellow, Colorless, Straw, Dark-Yellow    Appearance, UA Turbid (A) Clear    Specific Gravity, UA 1.018 1.005 - 1.030    pH, UA 6.5 5.0 - 8.5    Protein, UA 1+ (A) Negative    Glucose, UA Normal Negative, Normal    Ketones, UA Negative Negative    Blood, UA 3+ (A) Negative    Bilirubin, UA Negative Negative    Urobilinogen, UA Normal 0.2, 1.0, Normal    Nitrites, UA Negative Negative    Leukocyte Esterase, UA 25 (A) Negative    RBC, UA >100 (A) None Seen, 0-2, 3-5, 0-5 /HPF    WBC, UA 0-5 None Seen, 0-2, 3-5, 0-5 /HPF    Bacteria, UA None Seen None Seen, Trace /HPF    Squamous Epithelial Cells, UA None Seen None Seen, Trace, Rare /HPF    Mucous, UA Trace (A) None Seen /LPF   Lactic acid, plasma    Collection Time: 12/10/24 11:26 AM   Result Value Ref Range    Lactic Acid Level 1.1 0.5 - 2.2 mmol/L   Comprehensive metabolic panel    Collection Time: 12/10/24 11:27 AM   Result Value Ref Range    Sodium 142 136 - 145 mmol/L    Potassium 4.4 3.5 - 5.1 mmol/L    Chloride 105 98 - 107 mmol/L    CO2 34 (H) 23 - 31 mmol/L     Glucose 97 82 - 115 mg/dL    Blood Urea Nitrogen 27.7 (H) 8.4 - 25.7 mg/dL    Creatinine 1.00 0.72 - 1.25 mg/dL    Calcium 9.2 8.8 - 10.0 mg/dL    Protein Total 6.7 5.8 - 7.6 gm/dL    Albumin 3.5 3.4 - 4.8 g/dL    Globulin 3.2 2.4 - 3.5 gm/dL    Albumin/Globulin Ratio 1.1 1.1 - 2.0 ratio    Bilirubin Total 1.4 <=1.5 mg/dL    ALP 87 40 - 150 unit/L    ALT 8 0 - 55 unit/L    AST 30 5 - 34 unit/L    eGFR >60 mL/min/1.73/m2    Anion Gap 3.0 mEq/L    BUN/Creatinine Ratio 28    CBC with Differential    Collection Time: 12/10/24 11:27 AM   Result Value Ref Range    WBC 4.57 4.50 - 11.50 x10(3)/mcL    RBC 3.53 (L) 4.70 - 6.10 x10(6)/mcL    Hgb 8.7 (L) 14.0 - 18.0 g/dL    Hct 29.7 (L) 42.0 - 52.0 %    MCV 84.1 80.0 - 94.0 fL    MCH 24.6 (L) 27.0 - 31.0 pg    MCHC 29.3 (L) 33.0 - 36.0 g/dL    RDW 20.9 (H) 11.5 - 17.0 %    Platelet 128 (L) 130 - 400 x10(3)/mcL    MPV      Neut % 64.1 %    Lymph % 21.2 %    Mono % 12.3 %    Eos % 1.1 %    Basophil % 0.9 %    Lymph # 0.97 0.6 - 4.6 x10(3)/mcL    Neut # 2.93 2.1 - 9.2 x10(3)/mcL    Mono # 0.56 0.1 - 1.3 x10(3)/mcL    Eos # 0.05 0 - 0.9 x10(3)/mcL    Baso # 0.04 <=0.2 x10(3)/mcL    IG# 0.02 0 - 0.04 x10(3)/mcL    IG% 0.4 %    NRBC% 0.0 %    IPF 13.9 (H) 0.9 - 11.2 %   Vitamin B12    Collection Time: 12/10/24 11:27 AM   Result Value Ref Range    Vitamin B12 >2,000 (H) 213 - 816 pg/mL   TSH    Collection Time: 12/10/24 11:27 AM   Result Value Ref Range    TSH 14.492 (H) 0.350 - 4.940 uIU/mL   COVID/RSV/FLU A&B PCR    Collection Time: 12/10/24  1:38 PM   Result Value Ref Range    Influenza A PCR Not Detected Not Detected    Influenza B PCR Not Detected Not Detected    Respiratory Syncytial Virus PCR Not Detected Not Detected    SARS-CoV-2 PCR Not Detected Not Detected, Negative   Comprehensive Metabolic Panel    Collection Time: 12/11/24  3:57 AM   Result Value Ref Range    Sodium 141 136 - 145 mmol/L    Potassium 4.7 3.5 - 5.1 mmol/L    Chloride 105 98 - 107 mmol/L    CO2 34 (H) 23  "- 31 mmol/L    Glucose 75 (L) 82 - 115 mg/dL    Blood Urea Nitrogen 27.1 (H) 8.4 - 25.7 mg/dL    Creatinine 1.04 0.72 - 1.25 mg/dL    Calcium 8.6 (L) 8.8 - 10.0 mg/dL    Protein Total 6.1 5.8 - 7.6 gm/dL    Albumin 3.2 (L) 3.4 - 4.8 g/dL    Globulin 2.9 2.4 - 3.5 gm/dL    Albumin/Globulin Ratio 1.1 1.1 - 2.0 ratio    Bilirubin Total 1.2 <=1.5 mg/dL    ALP 91 40 - 150 unit/L    ALT 7 0 - 55 unit/L    AST 28 5 - 34 unit/L    eGFR >60 mL/min/1.73/m2    Anion Gap 2.0 mEq/L    BUN/Creatinine Ratio 26    CBC with Differential    Collection Time: 12/11/24  3:57 AM   Result Value Ref Range    WBC 5.55 4.50 - 11.50 x10(3)/mcL    RBC 3.69 (L) 4.70 - 6.10 x10(6)/mcL    Hgb 9.1 (L) 14.0 - 18.0 g/dL    Hct 31.3 (L) 42.0 - 52.0 %    MCV 84.8 80.0 - 94.0 fL    MCH 24.7 (L) 27.0 - 31.0 pg    MCHC 29.1 (L) 33.0 - 36.0 g/dL    RDW 20.2 (H) 11.5 - 17.0 %    Platelet 122 (L) 130 - 400 x10(3)/mcL    MPV      Neut % 47.5 %    Lymph % 30.6 %    Mono % 15.3 %    Eos % 1.6 %    Basophil % 1.6 %    Lymph # 1.70 0.6 - 4.6 x10(3)/mcL    Neut # 2.63 2.1 - 9.2 x10(3)/mcL    Mono # 0.85 0.1 - 1.3 x10(3)/mcL    Eos # 0.09 0 - 0.9 x10(3)/mcL    Baso # 0.09 <=0.2 x10(3)/mcL    IG# 0.19 (H) 0 - 0.04 x10(3)/mcL    IG% 3.4 %    NRBC% 0.0 %    IPF 15.8 (H) 0.9 - 11.2 %      No results found for: "PHENYTOIN", "PHENOBARB", "VALPROATE", "CBMZ"      Psychiatric Mental Status Exam:  General Appearance: appears stated age, normal weight, appropriately dressed, dressed in hospital garb, in no acute distress  Arousal: alert  Behavior: cooperative, pleasant, appropriate eye-contact  Movements and Motor Activity: +tremors  Orientation: oriented partially to time, oriented to person  Speech: normal rate  Mood: Near euthymic  Affect: mood-congruent  Thought Process: linear  Associations: no loosening of associations  Thought Content and Perceptions: no suicidal ideation, no homicidal ideation, no auditory hallucinations, no visual hallucinations, no paranoid " ideation, no delusions  Recent and Remote Memory: significant impairments noted; per interview/observation with patient  Attention and Concentration: attentive to conversation; per interview/observation with patient  Fund of Knowledge: vocabulary appropriate; based on history, vocabulary, fund of knowledge, syntax, grammar, and content  Insight: limited; based on understanding of severity of illness and HPI  Judgment: limited; based on patient's behavior and HPI    ASSESSMENT/PLAN:   Brief psychotic disorder F23  Insomnia F51.05      Recommendations:   Medication Management  Continue Seroquel 25mg orally at bedtime   Disposition  Per attending provider  Follow up with primary care provider upon discharge for medication management  Psych will sign off please reconsult it needed         MARY CRISTOBAL, [id number]

## 2024-12-11 NOTE — PROGRESS NOTES
Hospital Medicine  Progress Note    Patient Name: Liu Gaitan  MRN: [id number]  Status: IP- Inpatient   Admission Date: 12/10/2024  Length of Stay: 1  Date of Service: 12/11/2024       CC: hospital follow-up for hallucinations       SUBJECTIVE   89 y.o. male with a history that includes Parkinson's and dementia, presented to ED with worsening AMS x 2 days, with auditory and visual hallucinations.  He was diagnosed a UTI on 12/06 at [facility] after presenting there with hematuria.  He was started on Levaquin and discharged home.  He was subsequently complaining of abdominal discomfort, and family contacted his PCP who noted urine culture was negative and therefore was told to stop  Levaquin.  Wife was able to relay at his hallucinations has been going on for quite some time, at least several months.  She does note that they are getting much worse over the last several days.  She notes that the main reason for presentation is that she is unable to care for him at home with his degree of altered mental status.     Today: Patient seen and examined at bedside, and chart reviewed.  Still with some issues overnight, somewhat improved.      MEDICATIONS   Scheduled   apixaban  5 mg Oral BID    carbidopa-levodopa 12.5-50 mg  1 split tablet Oral TID    doxazosin  8 mg Oral Daily    finasteride  5 mg Oral Daily    metoprolol succinate  75 mg Oral Daily    QUEtiapine  50 mg Oral QHS    zinc oxide-cod liver oil   Topical (Top) Q3 Days     Continuous Infusions  None      PHYSICAL EXAM   VITALS: T 97.9 °F (36.6 °C)   BP (!) 104/56   P 78   RR 18   O2 (!) 90 %    GENERAL: Awake and in NAD  LUNGS: Course in the bases  CVS: Normal rate  GI/: Soft, NT, bowel sounds positive.  EXTREMITIES: 1-2+ LE edema  NEURO: Awake, alert, mostly oriented  PSYCH: Cooperative      LABS   CBC  Recent Labs     12/10/24  1127 12/11/24  0357   WBC 4.57 5.55   RBC 3.53* 3.69*   HGB 8.7* 9.1*   HCT 29.7* 31.3*   MCV 84.1 84.8   MCH 24.6* 24.7*   MCHC 29.3* 29.1*    RDW 20.9* 20.2*   * 122*     CHEM  Recent Labs     12/10/24  1127 12/11/24  0357    141   K 4.4 4.7   CO2 34* 34*   BUN 27.7* 27.1*   CREATININE 1.00 1.04   GLUCOSE 97 75*   CALCIUM 9.2 8.6*   ALBUMIN 3.5 3.2*   GLOBULIN 3.2 2.9   ALKPHOS 87 91   ALT 8 7   AST 30 28   BILITOT 1.4 1.2   TSH 14.492*  --          MICROBIOLOGY     Microbiology Results (last 7 days)       Procedure Component Value Units Date/Time    Blood culture #1 **CANNOT BE ORDERED STAT** [[id number]]  (Normal) Collected: 12/10/24 1126    Order Status: Completed Specimen: Blood from Wrist, Right Updated: 12/11/24 1501     Blood Culture No Growth At 24 Hours    Blood culture #2 **CANNOT BE ORDERED STAT** [[id number]]  (Normal) Collected: 12/10/24 1200    Order Status: Completed Specimen: Blood Updated: 12/11/24 1301     Blood Culture No Growth At 24 Hours              ASSESSMENT   A/V Hallucinations  Suspect underlying dementia  h/o Parkinson's disease  PAF on edoxaban    PLAN   Will increase Seroquel to 50 this evening and monitor  CM working on nursing home and hospice services on discharge  In the interim continue current management and monitoring   Will attempt a trial of oral Lasix for his excess fluid        Prophylaxis: Eliquis (on edoxaban at home)        Silvano Arteaga MD  [facility]

## 2024-12-11 NOTE — PLAN OF CARE
12/11/24 1025   Discharge Assessment   Assessment Type Discharge Planning Assessment   Confirmed/corrected address, phone number and insurance Yes   Confirmed Demographics Correct on Facesheet   Source of Information family   Communicated FATOU with patient/caregiver Date not available/Unable to determine   Reason For Admission hallucinations   People in Home spouse   Do you expect to return to your current living situation? Other (see comments)  (possible NH)   Do you have help at home or someone to help you manage your care at home? Yes   Who are your caregiver(s) and their phone number(s)? spouse though she is physically limited   Walking or Climbing Stairs Difficulty yes   Walking or Climbing Stairs ambulation difficulty, dependent;stair climbing difficulty, dependent;transferring difficulty, dependent   Mobility Management wheelchair but has been   Dressing/Bathing Difficulty yes   Dressing/Bathing bathing difficulty, dependent;dressing difficulty, dependent   Dressing/Bathing Management family has been doing for 2 weeks   Home Accessibility wheelchair accessible   Equipment Currently Used at Home wheelchair   Readmission within 30 days? No   Patient currently being followed by outpatient case management? No   Do you currently have service(s) that help you manage your care at home? Yes   Name and Contact number of agency  Once a week   Do you take prescription medications? Yes   Do you have prescription coverage? Yes   Coverage Medicare   Do you have any problems affording any of your prescribed medications? No   Is the patient taking medications as prescribed? yes   How do you get to doctors appointments? family or friend will provide  (prior to 2 weeks ago)   Are you on dialysis? No   Do you take coumadin? No   Discharge Plan A Inpatient Hospice   Discharge Plan B New Nursing Home placement - [facility] care facility   DME Needed Upon Discharge  none   Discharge Plan discussed with: Patient;Adult  children;Spouse/sig other   Transition of Care Barriers Underinsured;Other (see comments)  (potentially too many assets)

## 2024-12-11 NOTE — NURSING
Subjective:      Patient ID: Liu Gaitan is a 89 y.o. male.    Chief Complaint: Hallucinations and Urinary Tract Infection (Pt arrived [facility], d/t family members stated that pt is auditory/visual hallucination x2 days. Pt recently dx with UTI, EMS states pt has not taken all antibiotics. 4mg Zofran given in route. Pt oriented to self. )    HPI  Review of Systems   Objective:     Physical Exam   Assessment:     1. Hallucination, visual    2. Dyspnea    3. Parkinson's disease, unspecified whether dyskinesia present, unspecified whether manifestations fluctuate           Wound 12/10/24 1715 Skin Tear Left medial Buttocks (Active)   12/10/24 1715 Buttocks   Present on Original Admission: Y   Primary Wound Type: Skin tear   Side: Left   Orientation: medial   Wound Approximate Age at First Assessment (Weeks):    Wound Number:    Is this injury device related?:    Incision Type:    Closure Method:    Wound Description (Comments):    Type:    Additional Comments:    Ankle-Brachial Index:    Pulses:    Removal Indication and Assessment:    Wound Outcome:    Wound Image   12/11/24 0900   Dressing Appearance Open to air 12/11/24 0900   Drainage Amount None 12/11/24 0900   Appearance Pink;Moist 12/11/24 0900   Tissue loss description Not applicable 12/11/24 0900   Red (%), Wound Tissue Color 100 % 12/11/24 0900   Periwound Area Dry;Intact 12/11/24 0900   Wound Edges Irregular 12/11/24 0900   Wound Length (cm) 1 cm 12/11/24 0900   Wound Width (cm) 1 cm 12/11/24 0900   Wound Surface Area (cm^2) 1 cm^2 12/11/24 0900   Care Cleansed with:;Wound cleanser;Applied:;Skin Barrier 12/11/24 0900   Dressing Foam 12/11/24 0900       Plan:     Hallucinations  Decreased sinemet dosing to 1/2 tab TID  Encouraged family to have a family member at bedside throughout admission   Safety/fall precautions  WOCN consult--88 y/o male in with hallucinations and auditory .   See above hx.   At present his is awake and can answer questions.   He is  total care at this time.  Spoke with nurse Elsie.  Family in talks with [facility].    He has a skin tear left upper buttock.   Care put in place.    May be going home with Hospice.   I will recheck tomorrow for status.

## 2024-12-11 NOTE — PLAN OF CARE
Spoke to patient's family about discharge plan they are concerned that they will not be able to care for their dad adequately at home. They will go to see prompt succor. They do not feel that he is eligible for medicaid and they will be responsible for room and board. [facility] to call requested facilities to get prices.

## 2024-12-12 VITALS
HEIGHT: 69 IN | TEMPERATURE: 98 F | BODY MASS INDEX: 22.22 KG/M2 | OXYGEN SATURATION: 95 % | SYSTOLIC BLOOD PRESSURE: 95 MMHG | WEIGHT: 150 LBS | RESPIRATION RATE: 18 BRPM | DIASTOLIC BLOOD PRESSURE: 61 MMHG | HEART RATE: 76 BPM

## 2024-12-12 PROBLEM — G20.A1 PARKINSON'S DISEASE: Chronic | Status: ACTIVE | Noted: 2024-12-12

## 2024-12-12 LAB
T4 FREE SERPL-MCNC: 0.7 NG/DL (ref 0.7–1.48)
TSH SERPL-ACNC: 11.07 UIU/ML (ref 0.35–4.94)

## 2024-12-12 PROCEDURE — 25000003 PHARM REV CODE 250: Performed by: INTERNAL MEDICINE

## 2024-12-12 PROCEDURE — 36415 COLL VENOUS BLD VENIPUNCTURE: CPT | Performed by: INTERNAL MEDICINE

## 2024-12-12 PROCEDURE — 84443 ASSAY THYROID STIM HORMONE: CPT | Performed by: INTERNAL MEDICINE

## 2024-12-12 PROCEDURE — 84439 ASSAY OF FREE THYROXINE: CPT | Performed by: INTERNAL MEDICINE

## 2024-12-12 RX ORDER — POLYETHYLENE GLYCOL 3350 17 G/17G
17 POWDER, FOR SOLUTION ORAL 2 TIMES DAILY
Qty: 1020 G | Refills: 0 | Status: SHIPPED | OUTPATIENT
Start: 2024-12-12 | End: 2025-01-11

## 2024-12-12 RX ORDER — FINASTERIDE 5 MG/1
5 TABLET, FILM COATED ORAL DAILY
Qty: 30 TABLET | Refills: 0 | Status: SHIPPED | OUTPATIENT
Start: 2024-12-12 | End: 2025-01-11

## 2024-12-12 RX ORDER — DOXAZOSIN 8 MG/1
8 TABLET ORAL DAILY
Qty: 30 TABLET | Refills: 0 | Status: SHIPPED | OUTPATIENT
Start: 2024-12-13 | End: 2025-01-12

## 2024-12-12 RX ORDER — LEVOTHYROXINE SODIUM 75 UG/1
75 TABLET ORAL
Qty: 30 TABLET | Refills: 0 | Status: SHIPPED | OUTPATIENT
Start: 2024-12-12 | End: 2025-01-11

## 2024-12-12 RX ORDER — QUETIAPINE FUMARATE 50 MG/1
50 TABLET, FILM COATED ORAL NIGHTLY
Qty: 30 TABLET | Refills: 0 | Status: SHIPPED | OUTPATIENT
Start: 2024-12-12 | End: 2025-01-11

## 2024-12-12 RX ORDER — POLYETHYLENE GLYCOL 3350 17 G/17G
17 POWDER, FOR SOLUTION ORAL ONCE
Status: DISCONTINUED | OUTPATIENT
Start: 2024-12-12 | End: 2024-12-12 | Stop reason: HOSPADM

## 2024-12-12 RX ORDER — CARBIDOPA AND LEVODOPA 25; 100 MG/1; MG/1
1 TABLET, EXTENDED RELEASE ORAL 2 TIMES DAILY
Qty: 60 TABLET | Refills: 0 | Status: SHIPPED | OUTPATIENT
Start: 2024-12-12 | End: 2025-01-11

## 2024-12-12 RX ORDER — FUROSEMIDE 20 MG/1
20 TABLET ORAL EVERY OTHER DAY
Qty: 15 TABLET | Refills: 0 | Status: SHIPPED | OUTPATIENT
Start: 2024-12-13 | End: 2025-01-12

## 2024-12-12 RX ORDER — METOPROLOL SUCCINATE 25 MG/1
75 TABLET, EXTENDED RELEASE ORAL DAILY
Qty: 90 TABLET | Refills: 0 | Status: SHIPPED | OUTPATIENT
Start: 2024-12-13 | End: 2025-01-12

## 2024-12-12 RX ADMIN — FINASTERIDE 5 MG: 5 TABLET, FILM COATED ORAL at 08:12

## 2024-12-12 RX ADMIN — APIXABAN 5 MG: 5 TABLET, FILM COATED ORAL at 08:12

## 2024-12-12 RX ADMIN — DOXAZOSIN 8 MG: 4 TABLET ORAL at 08:12

## 2024-12-12 RX ADMIN — CARBIDOPA AND LEVODOPA 1 SPLIT TABLET: 25; 100 TABLET ORAL at 08:12

## 2024-12-12 RX ADMIN — METOPROLOL SUCCINATE 75 MG: 50 TABLET, EXTENDED RELEASE ORAL at 08:12

## 2024-12-12 NOTE — PLAN OF CARE
Problem: Adult Inpatient Plan of Care  Goal: Plan of Care Review  Outcome: Met  Goal: Patient-Specific Goal (Individualized)  Outcome: Met  Goal: Absence of Hospital-Acquired Illness or Injury  Outcome: Met  Goal: Optimal Comfort and Wellbeing  Outcome: Met  Goal: Readiness for Transition of Care  Outcome: Met     Problem: Skin Injury Risk Increased  Goal: Skin Health and Integrity  Outcome: Met     Problem: Wound  Goal: Optimal Coping  Outcome: Met  Goal: Optimal Functional Ability  Outcome: Met  Goal: Absence of Infection Signs and Symptoms  Outcome: Met  Goal: Improved Oral Intake  Outcome: Met  Goal: Optimal Pain Control and Function  Outcome: Met  Goal: Skin Health and Integrity  Outcome: Met  Goal: Optimal Wound Healing  Outcome: Met     Problem: Fall Injury Risk  Goal: Absence of Fall and Fall-Related Injury  Outcome: Met

## 2024-12-12 NOTE — PLAN OF CARE
[facility] called Keren at [facility] to follow up on who to call report to, no answer, left vm

## 2024-12-12 NOTE — PLAN OF CARE
[facility] [place] discharge information to [facility]

## 2024-12-12 NOTE — NURSING
Report called to [facility] spoke to Jane, all questions answered. Patient discharge via [facility].

## 2024-12-12 NOTE — PROGRESS NOTES
Inpatient Nutrition Evaluation    Admit Date: 12/10/2024   Total duration of encounter: 2 days    Nutrition Recommendation/Prescription     Continue oral diet as tolerated; Diet Heart Healthy     Nutrition Assessment     Chart Review    Reason Seen: malnutrition screening tool (MST)    Malnutrition Screening Tool Results   Have you recently lost weight without trying?: Yes: 2-13 lbs  Have you been eating poorly because of a decreased appetite?: Yes   MST Score: 2     Diagnosis:  A/V Hallucinations  Suspect underlying dementia    Relevant Medical History: Parkinson's and dementia     Nutrition-Related Medications: Scheduled Medications:  apixaban, 5 mg, BID  carbidopa-levodopa 12.5-50 mg, 1 split tablet, TID  doxazosin, 8 mg, Daily  finasteride, 5 mg, Daily  furosemide, 20 mg, Every other day  metoprolol succinate, 75 mg, Daily  QUEtiapine, 50 mg, QHS  zinc oxide-cod liver oil, , Q3 Days    Continuous Infusions:   PRN Medications:    apixaban tablet 5 mg    carbidopa-levodopa 12.5-50 mg split tablet 1 split tablet    doxazosin tablet 8 mg    finasteride tablet 5 mg    furosemide tablet 20 mg    metoprolol succinate 24 hr tablet 75 mg    QUEtiapine tablet 50 mg    zinc oxide-cod liver oil 40 % paste        Nutrition-Related Labs:  Recent Labs   Lab 12/10/24  1127 12/11/24  0357    141   K 4.4 4.7   CALCIUM 9.2 8.6*    105   CO2 34* 34*   BUN 27.7* 27.1*   CREATININE 1.00 1.04   EGFRNORACEVR >60 >60   GLUCOSE 97 75*   BILITOT 1.4 1.2   ALKPHOS 87 91   ALT 8 7   AST 30 28   ALBUMIN 3.5 3.2*   WBC 4.57 5.55   HGB 8.7* 9.1*   HCT 29.7* 31.3*        Diet Order: Diet Heart Healthy  Oral Supplement Order: none  Appetite/Oral Intake: good/50-75% of meals  Factors Affecting Nutritional Intake: none identified  Food/Episcopal/Cultural Preferences: unable to obtain  Food Allergies: no known food allergies       Wound(s):      Wound 12/10/24 1715 Skin Tear Left medial Buttocks-Tissue loss description: Not applicable  "    Comments    12/12/24 Pt sleeping, no family in room; noted plans to d/c with hospice care.    Anthropometrics    Height: 5' 9" (175.3 cm) Height Method: Estimated  Last Weight: 68 kg (150 lb) (12/10/24 2330) Weight Method: Bed Scale  BMI (Calculated): 22.1  BMI Classification: normal (BMI 18.5-24.9)        Ideal Body Weight (IBW), Male: 160 lb     % Ideal Body Weight, Male (lb): 93.75 %                          Usual Weight Provided By: unable to obtain usual weight    Wt Readings from Last 5 Encounters:   12/10/24 68 kg (150 lb)     Weight Change(s) Since Admission:  Admit Weight: 68 kg (150 lb) (12/10/24 1052)      Patient Education    Not applicable.    Monitoring & Evaluation     Dietitian will monitor food and beverage intake.  Nutrition Risk/Follow-Up: low (follow-up in 5-7 days)  Patients assigned 'low nutrition risk' status do not qualify for a full nutritional assessment but will be monitored and re-evaluated in a 5-7 day time period. Please consult if re-evaluation needed sooner.   "

## 2024-12-12 NOTE — DISCHARGE SUMMARY
[facility] 5F MEDICAL SURGICAL U*   Hospital Medicine Discharge Summary    Admit Date: 12/10/2024  Discharge Date and Time: 12/12/202410:09 AM  Admitting Physician: [unfilled]  Discharging Physician: Jonathan Duong MD.  Primary Care Physician: Jayesh Casper  Consults (From admission, onward)          Status Ordering Provider     Inpatient consult to neurology  Once        Provider:  Naeem Payne MD    Completed NARCISA JACOBO     Inpatient consult to Psychology  Once        Provider:  Giuliano Dasilva NP    Completed NARCISA JACOBO                 Discharge Diagnoses:  A/V Hallucinations  Suspect underlying dementia  h/o Parkinson's disease  PAF  Hypothyroidism  Peripheral edema      HPI  89 y.o. male with a history that includes Parkinson's and dementia, presented to ED with worsening AMS x 2 days, with auditory and visual hallucinations. He was diagnosed a UTI on 12/06 at [facility] after presenting there with hematuria. He was started on Levaquin and discharged home. He was subsequently complaining of abdominal discomfort, and family contacted his PCP who noted urine culture was negative and therefore was told to stop Levaquin. Wife was able to relay at his hallucinations has been going on for quite some time, at least several months. She does note that they are getting much worse over the last several days. She notes that the main reason for presentation is that she is unable to care for him at home with his degree of altered mental status.       Hospital Course:   The pt's family requested NH placement with hospice due to the inability to care for the patient at home. He was accepted by [facility] and will be discharged today. He was noted to have hypothyroidism so synthroid has been started. He was also placed on lasix for peripheral edema and small Right lung effusion. DC plans d/w patient's wife and daughter at the bedside.       Pt was seen and  examined on the day of discharge.    Vitals:  VITAL SIGNS: 24 HRS MIN & MAX LAST   Temp  Min: 96.9 °F (36.1 °C)  Max: 98.4 °F (36.9 °C) 97.9 °F (36.6 °C)   BP  Min: 102/65  Max: 143/75 109/63   Pulse  Min: 71  Max: 98  86   Resp  Min: 18  Max: 18 18   SpO2  Min: 90 %  Max: 99 % 98 %       Physical Exam:  GENERAL: Awake and in NAD  LUNGS: Clear, no wheezing or crackles  CVS: S1 S2 RRR  GI/: Soft, NT, bowel sounds positive.  EXTREMITIES: Trace -1+ LE edema b/l  NEURO: Awake, alert, confused  PSYCH: Cooperative      Procedures Performed:   No admission procedures for hospital encounter.     Significant Diagnostic Studies: See Full reports for all details    Recent Labs   Lab 12/10/24  1127 12/11/24  0357   WBC 4.57 5.55   RBC 3.53* 3.69*   HGB 8.7* 9.1*   HCT 29.7* 31.3*   MCV 84.1 84.8   MCH 24.6* 24.7*   MCHC 29.3* 29.1*   RDW 20.9* 20.2*   * 122*       Recent Labs   Lab 12/10/24  1127 12/11/24  0357    141   K 4.4 4.7    105   CO2 34* 34*   BUN 27.7* 27.1*   CREATININE 1.00 1.04   CALCIUM 9.2 8.6*   ALBUMIN 3.5 3.2*   ALKPHOS 87 91   ALT 8 7   AST 30 28   BILITOT 1.4 1.2        Microbiology Results (last 7 days)       Procedure Component Value Units Date/Time    Blood culture #1 **CANNOT BE ORDERED STAT** [[id number]]  (Normal) Collected: 12/10/24 1126    Order Status: Completed Specimen: Blood from Wrist, Right Updated: 12/11/24 1501     Blood Culture No Growth At 24 Hours    Blood culture #2 **CANNOT BE ORDERED STAT** [[id number]]  (Normal) Collected: 12/10/24 1200    Order Status: Completed Specimen: Blood Updated: 12/11/24 1301     Blood Culture No Growth At 24 Hours             X-Ray Chest AP Portable  Narrative: EXAMINATION:  XR CHEST AP PORTABLE    CLINICAL HISTORY:  Dyspnea, unspecified    COMPARISON:  There are no available comparisons at the time of dictation.    FINDINGS:  The heart is prominent in size.  There are hazy opacities over the right lung with likely small pleural effusion.   There is a suspected large hiatal hernia projecting over the mediastinum.  There is no visible pneumothorax.  Impression: 1. Right-sided hazy airspace opacities with small effusion.  2. Suspected large hiatal hernia projecting over the mediastinum.    Electronically signed by: Mony Beltran  Date:    12/10/2024  Time:    14:00  CT Head Without Contrast  Narrative: EXAMINATION:  CT HEAD WITHOUT CONTRAST    CLINICAL HISTORY:  Mental status change, unknown cause;    TECHNIQUE:  Axial scans were obtained from skull base to the vertex.    Coronal and sagittal reconstructions obtained from the axial data.    Automatic exposure control was utilized to limit radiation dose.    Contrast: None    Radiation Dose:    Total DLP: 3148 mGy*cm    COMPARISON:  None    FINDINGS:  There is no acute intracranial hemorrhage or edema. The gray-white matter differentiation is preserved.    There is no mass effect or midline shift.  There is diffuse parenchymal volume loss.  The basal cisterns are patent. There is no abnormal extra-axial fluid collection.  Carotid artery calcifications are noted.    The calvarium and skull base are intact.  There is trace scattered paranasal sinus mucosal thickening.  Small right mastoid effusion is noted.  Impression: No acute intracranial abnormality.    Electronically signed by: Mony Beltran  Date:    12/10/2024  Time:    11:43         Medication List        START taking these medications      apixaban 5 mg Tab  Commonly known as: ELIQUIS  Take 1 tablet (5 mg total) by mouth 2 (two) times daily.  Replaces: edoxaban 30 mg tablet     furosemide 20 MG tablet  Commonly known as: LASIX  Take 1 tablet (20 mg total) by mouth every other day.  Start taking on: December 13, 2024     levothyroxine 75 MCG tablet  Commonly known as: SYNTHROID  Take 1 tablet (75 mcg total) by mouth before breakfast.     polyethylene glycol 17 gram/dose powder  Commonly known as: GLYCOLAX  Take 17 g by mouth 2 (two) times  daily.     QUEtiapine 50 MG tablet  Commonly known as: SEROQUEL  Take 1 tablet (50 mg total) by mouth every evening.     zinc oxide-cod liver oil 40 % Pste paste  Commonly known as: DESITIN  Apply topically Every 3 (three) days.  Start taking on: December 14, 2024            CHANGE how you take these medications      carbidopa-levodopa  mg  mg Tbsr  Commonly known as: SINEMET CR  Take 1 tablet by mouth 2 (two) times daily. Take with food  What changed: when to take this     doxazosin 8 MG Tab  Commonly known as: CARDURA  Take 1 tablet (8 mg total) by mouth once daily.  Start taking on: December 13, 2024  What changed: how much to take     metoprolol succinate 25 MG 24 hr tablet  Commonly known as: TOPROL-XL  Take 3 tablets (75 mg total) by mouth once daily.  Start taking on: December 13, 2024  What changed: medication strength            CONTINUE taking these medications      finasteride 5 mg tablet  Commonly known as: PROSCAR  Take 1 tablet (5 mg total) by mouth once daily.     ICAR-C 100-250 mg Tab  Generic drug: iron-vitamin C 100-250 mg (ICAR-C)            STOP taking these medications      edoxaban 30 mg tablet  Commonly known as: SAVAYSA  Replaced by: apixaban 5 mg Tab     magnesium oxide 400 mg (241.3 mg magnesium) tablet  Commonly known as: MAG-OX               Where to Get Your Medications        These medications were sent to [facility] [address] [place] [address]      Phone: [phone]   apixaban 5 mg Tab  carbidopa-levodopa  mg  mg Tbsr  doxazosin 8 MG Tab  finasteride 5 mg tablet  furosemide 20 MG tablet  levothyroxine 75 MCG tablet  metoprolol succinate 25 MG 24 hr tablet  polyethylene glycol 17 gram/dose powder  QUEtiapine 50 MG tablet  zinc oxide-cod liver oil 40 % Pste paste          Explained in detail to the patient the discharge plan, medications, and follow-up visits. Pt understands and agrees with the treatment  plan.  Discharge Disposition: Hospice/Medical Facility  Discharged Condition: stable  Diet-   Dietary Orders (From admission, onward)       Start     Ordered    12/10/24 1441  Diet Heart Healthy  Diet effective now         12/10/24 1440                   Medications Per DC med rec  Activities as tolerated    For further questions contact hospitalist office.    Discharge time >30 minutes    For worsening symptoms, chest pain, shortness of breath, increased abdominal pain, high grade fever, stroke or stroke like symptoms, immediately go to the nearest Emergency Room or call 911 as soon as possible.    Social Drivers of Health     Tobacco Use: Low Risk  (10/8/2024)    Received from [employer]sidi[facility] and Affiliates    Patient History     Smoking Tobacco Use: Never     Smokeless Tobacco Use: Never     Passive Exposure: Never   Alcohol Use: Not on file   Financial Resource Strain: Low Risk  (12/10/2024)    Overall Financial Resource Strain (CARDIA)     Difficulty of Paying Living Expenses: Not hard at all   Food Insecurity: No Food Insecurity (12/10/2024)    Hunger Vital Sign     Worried About Running Out of Food in the Last Year: Never true     Ran Out of Food in the Last Year: Never true   Transportation Needs: No Transportation Needs (12/10/2024)    TRANSPORTATION NEEDS     Transportation : No   Physical Activity: Not on file   Stress: No Stress Concern Present (12/10/2024)    [employer] - Occupational Stress Questionnaire     Feeling of Stress : Not at all   Housing Stability: Low Risk  (12/10/2024)    Housing Stability Vital Sign     Unable to Pay for Housing in the Last Year: No     Homeless in the Last Year: No   Depression: Not at risk (10/8/2024)    Received from [employer] [facility]    PHQ-2     PHQ-2 Score: 0   Utilities: Not At Risk (12/10/2024)    [facility] Utilities      Threatened with loss of utilities: No   Health Literacy: Adequate Health Literacy (12/10/2024)     Health Literacy     Frequency of need for help with medical instructions: Never   Social Isolation: Not on file         Jonathan Weber M.D, on 12/12/2024. at 10:09 AM.

## 2024-12-12 NOTE — PLAN OF CARE
[facility] sent discharge information via [employer], awaiting nurse to call report to

## 2024-12-13 LAB — T PALLIDUM AB SER QL AGGL: NEGATIVE

## 2024-12-15 LAB
BACTERIA BLD CULT: NORMAL
BACTERIA BLD CULT: NORMAL